# Patient Record
Sex: FEMALE | Race: WHITE | Employment: FULL TIME | URBAN - METROPOLITAN AREA
[De-identification: names, ages, dates, MRNs, and addresses within clinical notes are randomized per-mention and may not be internally consistent; named-entity substitution may affect disease eponyms.]

---

## 2019-10-11 ENCOUNTER — OFFICE VISIT (OUTPATIENT)
Dept: INTERNAL MEDICINE | Facility: CLINIC | Age: 24
End: 2019-10-11
Payer: COMMERCIAL

## 2019-10-11 VITALS
OXYGEN SATURATION: 99 % | DIASTOLIC BLOOD PRESSURE: 76 MMHG | WEIGHT: 139 LBS | SYSTOLIC BLOOD PRESSURE: 110 MMHG | HEART RATE: 70 BPM

## 2019-10-11 DIAGNOSIS — Z12.4 SCREENING FOR CERVICAL CANCER: ICD-10-CM

## 2019-10-11 DIAGNOSIS — Z23 NEED FOR MENINGITIS VACCINATION: ICD-10-CM

## 2019-10-11 DIAGNOSIS — Z00.00 ROUTINE HISTORY AND PHYSICAL EXAMINATION OF ADULT: Primary | ICD-10-CM

## 2019-10-11 SDOH — HEALTH STABILITY: MENTAL HEALTH: HOW OFTEN DO YOU HAVE A DRINK CONTAINING ALCOHOL?: 2-3 TIMES A WEEK

## 2019-10-11 NOTE — NURSING NOTE
Chief Complaint   Patient presents with     Gyn Exam     pt here for pap smear     Imm/Inj     2nd meningitis shot and flu shot     Kimberly Nissen, EMT at 10:23 AM on 10/11/2019

## 2019-10-11 NOTE — PROGRESS NOTES
[facility]  Primary Care Center   Cheyenne Hinson, NGOZI CNP  10/11/2019      Chief Complaint:   Gyn Exam and Imm/Inj     History of Present Illness:   Charleen Perales is a 24 year old female with a history of epilepsy who presents to establish care and for an annual physical exam with pap smear.  She moved here from [place] 3 months ago for a new job and is a professional ballroom dancer and instructor.     Epilepsy  She was diagnosed with epilepsy in 2004 and was cleared in 2008. She did take medications previously, but does not recall which ones. She had a head injury on the playground as a child and a suitcase fell on her head prior to the seizures.     Costochondritis   She was seen for this in [place] about 6 months ago and was confirmed by ultrasound. It is tender when palpated. She has used ice with no relief. She was told it would resolve without intervention.     Other concerns discussed:  1. Meningitis - She had the first shot in March 2019 and was told she needs a second booster vaccine. She used to live in communal housing and graduated college in May 2019.   2. Diet - She eats fish and does not eat meat.   3. Menses - She has regular periods with no heavy bleeding or clots.    4. Pap - She has never had a pap smear before and has never been sexually active.    Health Maintenance  Pap (females age 21 - 65): every 3 years, every 5 years after age 35 if cotesting done - Recommended, completed today, never had one prior   Glucose: every 5 years, yearly if risk factors - Up to date  Lipid panel: every 5 years, yearly if risk factors - Up to date  Immunizations (Tetanus every 10 years, Influenza yearly, Pneumonia PPV-23 and PCV-13 age ? 65 or sooner if risk factors) - Recommended, flu shot, second meningitis vaccine since she has already had the first one     The following health maintenance issues were also reviewed and addressed as needed:  Blood pressure (>18 years annually)  Depression - PHQ-2  Score: 0/6  Lifestyle habits (including exercise, diet, weight)  Health risk behaviors (sexual history, alcohol, tobacco, drug use, partner violence)  Routine eye, dental, hearing, and skin exams     Review of Systems:   Pertinent items are noted in HPI or as in patient entered ROS below, remainder of complete ROS is negative.   Answers for HPI/ROS submitted by the patient on 10/10/2019   General Symptoms: No  Skin Symptoms: No  HENT Symptoms: No  EYE SYMPTOMS: No  HEART SYMPTOMS: No  LUNG SYMPTOMS: No  INTESTINAL SYMPTOMS: No  URINARY SYMPTOMS: No  GYNECOLOGIC SYMPTOMS: No  BREAST SYMPTOMS: No  SKELETAL SYMPTOMS: No  BLOOD SYMPTOMS: No  NERVOUS SYSTEM SYMPTOMS: No  MENTAL HEALTH SYMPTOMS: No    Active Medications:   No current outpatient medications on file.      Allergies:   Patient has no known allergies.      Past Medical History:  Epilepsy      Past Surgical History:  [place] [facility], 4-5 years ago     Family History:    Father - non-Hodgkin's lymphoma, in remission     Social History:   The patient was alone.   Smoking Status: Never smoker    Smokeless Tobacco: Never used   Alcohol Use: Yes, 1 -2 drinks a few times a week  Marital Status: Never been sexually active, not in a relationship   Employment status:  Professional ballroom dancer and instructor.   Home: Originally from [place] ([place] of [place])     Physical Exam:   /76   Pulse 70   Wt 63 kg (139 lb)   LMP 10/07/2019 (Exact Date)   SpO2 99%      Constitutional: no distress, comfortable, pleasant, well-groomed  Eyes: anicteric, conjunctiva pink, normal extra-ocular movements   Ears, Nose and Throat: tympanic membranes pearly gray with positive light reflex, EACs clear bilaterally, nose clear and free of lesions, throat clear, mucosa pink and moist.   Neck: supple with full range of motion, no thyromegaly.   Cardiovascular: regular rate and rhythm, normal S1 and S2, no murmurs, rubs or gallops, peripheral pulses full and  symmetric  Respiratory: clear to auscultation with good air movement bilaterally, no wheezes or crackles, non-labored  Gastrointestinal: positive bowel sounds, nontender, no hepatosplenomegaly, no masses   :  Periurethral, vulvar, perineal, perianal areas are without rash or suspicious lesions.  Vaginal mucosa pink and moist, rugae with physiologic white odorless secretions, no lesions noted. Cervix is pink, moist, closed and without lesion or CMT.  Bimanual exam reveals no uterine or adnexal tenderness.  Uterus and ovaries not enlarged, no masses appreciated.  Musculoskeletal: full range of motion, strength 5/5, no edema   Skin: no concerning lesions or rash, no jaundice, temp normal   Neurological: cranial nerves intact, normal strength and sensation, 2+ patellar reflexes, gait is steady with intact balance, speech is clear, no tremor   Psychological: appropriate mood, demonstrates intact judgment and logical thought process  LYMPH: no cervical,  supraclavicular, or infraclavicular nodes     Assessment and Plan:  Routine history and physical examination of adult  Reviewed and updated routine health maintenance as appropriate. Encouraged to continue with healthy lifestyle with regular physical activity, nutritious diet, stress management and safety. Condom use recommended if sexually active, RTC to discuss birth control options as needed in the future. Will request records from [place] for ultrasound results for her costochondritis.   Here for preventive examination  Breast examination performed.No  Pelvic examination performed Yes    Pap   Yes  If normal PAP results pap every 3 years.  Breast cancer screening  not indicated Not in age gruop  Colorectal screening not indicated  Not in age group  Osteoporosis screening not indicated.  Not in age group.  Immunizations reviewed and updated in EPIC  Labs ordered None  Counseling was provided in the following areas:  safe sex practices/STD prevention if she decides to  become sexually active    Need for meningitis vaccination  Low-risk, but will complete series for full immunity.    - meningococcal vaccine B, Recomb, (BEXSERO) injection  Dispense: 0.5 mL; Refill: 0    Screening for cervical cancer  Completed in clinic today with no complications.   - Pap imaged thin layer screen only - recommended age 21 - 24 years    Follow-up: PRN      Scribe Disclosure:  I, Liliana Rivas, am serving as a scribe to document services personally performed by NGOZI Roberson CNP at this visit, based upon the provider's statements to me. All documentation has been reviewed by the aforementioned provider prior to being entered into the official medical record.     Portions of this medical record were completed by a scribe. UPON MY REVIEW AND AUTHENTICATION BY ELECTRONIC SIGNATURE, this confirms (a) I performed the applicable clinical services, and (b) the record is accurate.     NGOZI Roberson CNP

## 2019-10-16 LAB
COPATH REPORT: NORMAL
PAP: NORMAL

## 2020-10-01 ENCOUNTER — OFFICE VISIT (OUTPATIENT)
Dept: FAMILY MEDICINE | Facility: CLINIC | Age: 25
End: 2020-10-01
Payer: COMMERCIAL

## 2020-10-01 VITALS
WEIGHT: 136 LBS | HEART RATE: 85 BPM | HEIGHT: 67 IN | DIASTOLIC BLOOD PRESSURE: 60 MMHG | TEMPERATURE: 97.6 F | OXYGEN SATURATION: 97 % | BODY MASS INDEX: 21.35 KG/M2 | RESPIRATION RATE: 14 BRPM | SYSTOLIC BLOOD PRESSURE: 102 MMHG

## 2020-10-01 DIAGNOSIS — Z23 NEED FOR VACCINATION: ICD-10-CM

## 2020-10-01 DIAGNOSIS — M79.89 MASS OF SOFT TISSUE: Primary | ICD-10-CM

## 2020-10-01 DIAGNOSIS — Z23 NEED FOR DIPHTHERIA-TETANUS-PERTUSSIS (TDAP) VACCINE: ICD-10-CM

## 2020-10-01 DIAGNOSIS — Z23 NEED FOR PROPHYLACTIC VACCINATION AND INOCULATION AGAINST INFLUENZA: ICD-10-CM

## 2020-10-01 PROCEDURE — 99213 OFFICE O/P EST LOW 20 MIN: CPT | Performed by: NURSE PRACTITIONER

## 2020-10-01 PROCEDURE — 90686 IIV4 VACC NO PRSV 0.5 ML IM: CPT | Performed by: NURSE PRACTITIONER

## 2020-10-01 ASSESSMENT — PAIN SCALES - GENERAL: PAINLEVEL: NO PAIN (0)

## 2020-10-01 ASSESSMENT — MIFFLIN-ST. JEOR: SCORE: 1404.52

## 2020-10-01 NOTE — NURSING NOTE
Tdap and Flu shot administered. Staff unable to enter TDAP in immunization documentation     Adacel   Lot [id number]  Ex- 07/03/22  NDC 12800-989-67  Right Deltoid     Placed into chart historically    Mel Acuña MA

## 2020-10-01 NOTE — PROGRESS NOTES
"Subjective     Charleen Perales is a 25 year old female who presents to clinic today for the following health issues:    HPI         CHEST Bump  First noticed the bump on the left side of upper sternum about two years ago.  Tender only if pushed on firmly.  The bump doesn't move.  Grew for first six months and not since.  Evaluated about 1.5 years ago including US which was normal and she was ultimately diagnosed \"probably costochondritis.\"  This was in [place].    Believes she needs a second of two Tdap shots.  Reviewed record and it appear she received the second of two meningococcal shots 10/2019 and Tdap was due 4/2020.  Would like to get flu shot.    Review of Systems     ROS:5 point ROS including CONST, HEENT, Respiratory, CV, and GI other than that noted in the HPI,  is negative         Objective    /60   Pulse 85   Temp 97.6  F (36.4  C) (Oral)   Resp 14   Ht 1.702 m (5' 7\")   Wt 61.7 kg (136 lb)   SpO2 97%   BMI 21.30 kg/m    Body mass index is 21.3 kg/m .  Physical Exam   GENERAL: healthy, alert and no distress  NECK: no adenopathy, no asymmetry, masses, or scars and thyroid normal to palpation; no supraclavicular or infraclavicular lymphadenopathy  RESP: lungs clear to auscultation - no rales, rhonchi or wheezes  CV: regular rate and rhythm, normal S1 S2, no S3 or S4, no murmur, click or rub, no peripheral edema and peripheral pulses strong  MS: upper extremities - normal muscle tone, normal range of motion and 2 cm non-mobile, firm, palpable mass left chest approximately where 2nd rib attaches to sternum.  It is slightly tender to palpation          Assessment & Plan     (M79.89) Mass of soft tissue  (primary encounter diagnosis)  Comment:   Plan: Awaiting records from [place] including the ultrasound.  No clear diagnosis, but with the area not growing and presumably non-worrisome US results we can continue to monitor.    (Z23) Need for diphtheria-tetanus-pertussis (Tdap) " vaccine  Comment:   Plan: VACCINE ADMINISTRATION, INITIAL, VACCINE         ADMINISTRATION, INITIAL, Each additional admin.        (Right click and add QUANTITY)  [07883],         CANCELED: TDAP, IM (10 - 64 YRS) - Adacel,         CANCELED: TDAP, IM (10 - 64 YRS) - Adacel,         CANCELED: TDAP, IM (10 - 64 YRS) - Adacel,         CANCELED: TDAP VACCINE (ADACEL) [57490.002],         CANCELED: TDAP VACCINE            (Z23) Need for prophylactic vaccination and inoculation against influenza  Comment:   Plan: INFLUENZA VACCINE IM > 6 MONTHS VALENT IIV4         [98760], Vaccine Administration, Initial         [99936], CANCELED: INFLUENZA VACCINE IM 4YRS+ 4        VALENT CCIIV4            (Z23) Need for vaccination  Comment:   Plan:     Return in about 1 year (around 10/1/2021) for Physical Exam.    NGOZI Peñaloza CNP  [facility] [place]

## 2020-12-15 ENCOUNTER — VIRTUAL VISIT (OUTPATIENT)
Dept: INTERNAL MEDICINE | Facility: CLINIC | Age: 25
End: 2020-12-15
Payer: COMMERCIAL

## 2020-12-15 DIAGNOSIS — U07.1 INFECTION DUE TO 2019 NOVEL CORONAVIRUS: ICD-10-CM

## 2020-12-15 DIAGNOSIS — J30.89 NON-SEASONAL ALLERGIC RHINITIS, UNSPECIFIED TRIGGER: Primary | ICD-10-CM

## 2020-12-15 PROCEDURE — 99213 OFFICE O/P EST LOW 20 MIN: CPT | Mod: 95 | Performed by: PATHOLOGY

## 2020-12-15 RX ORDER — FLUTICASONE PROPIONATE 50 MCG
1 SPRAY, SUSPENSION (ML) NASAL DAILY
Qty: 11.1 ML | Refills: 3 | Status: SHIPPED | OUTPATIENT
Start: 2020-12-15

## 2020-12-15 NOTE — PROGRESS NOTES
"This patient is being evaluated via a billable video visit as an alternative to an in-person visit.       The patient has been notified of following:     \"This video visit will be conducted via a call between you and your physician/provider. We have found that certain health care needs can be provided without the need for an in-person physical exam.  This service lets us provide the care you need with a video conversation.  If a prescription is necessary we can send it directly to your pharmacy.  If lab work is needed we can place an order for that and you can then stop by our lab to have the test done at a later time. If during the course of the call the physician/provider feels a video visit is not appropriate, you will not be charged for this service.\"     Patient has given verbal consent for virtual video visit? Yes  Did patient initiate this virtual visit? Yes    Person spoken to:  Patient    This was a synchronous virtual visit  Location of patient: home  Location of physician:  home office  Department name:  Medicine  Mode of communication:  Video Conference via Doximity    Time video initiated:  10:35  Time video ended:  10:52  Total length of video visit: 17 mins      PRIMARY CARE CENTER         HPI:        Charleen Perales is a 25 year old female who presents with concerns regarding a persistent runny nose.     Charleen reports that she had been ill with COVID in mid-October. Symptoms were fatigue and mental fog. Since that time, she reports persistent runny nose and increased sneezing. She denies itchy nose, headache, or facial pain. No known history of allergies. Symptoms are worst in the morning right after waking up and at night. There is a cat in the home. Has not noticed any mold. Denies fevers, chills, cough, shortness of breath, headache. 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 "     Patient had no additional concerns.     Problem, Medication and Allergy Lists were reviewed and are current.  Patient is an established patient of this clinic.         Review of Systems:   10-point ROS was negative other than as per HPI.    ROS  I have personally reviewed and updated the complete ROS on the day of the visit.           Physical Exam:   This was a virtual visit.   No vitals were taken.      Video visit.   Patient sitting upright, conversant, in no acute distress. Speech fluent. No sneezing during this short video interview. Does not appear to be short of breath, speaking full sentences normally.       Results:   Labs/tests ordered this encounter: none    Assessment and Plan     Charleen was seen today for allergic rhinitis.    Diagnoses and all orders for this visit:    Non-seasonal allergic rhinitis  Feel that allergies are due to an allergen present inside the household. Highest concern is that these symptoms are due to the cat. Would encourage keeping the cat off the bed and suggest a HEPA air filter in the bedroom. Prescribed trial of Flonase. Patient encouraged to follow up if symptoms persist.   -     fluticasone (FLONASE) 50 MCG/ACT nasal spray; Spray 1 spray into both nostrils daily    Infection due to 2019 novel coronavirus  -     COVID-19 Virus (Coronavirus) Antibody & Titer Reflex; Future      Options for treatment and follow-up care were reviewed with the patient. Charleen GOODEN Diaz engaged in the decision making process and verbalized understanding of the options discussed and agreed with the final plan.      Florinda Lemon MD MPH, PGY-6  Internal Medicine  p:[phone]  December 15, 2020      Pt was seen and plan of care discussed with attending physician Dr. YOVANNY Mendiola

## 2020-12-15 NOTE — NURSING NOTE
Chief Complaint   Patient presents with     Recheck Medication     follow up     Kimberly Nissen, EMT at 9:42 AM on 12/15/2020'

## 2021-01-03 ENCOUNTER — HEALTH MAINTENANCE LETTER (OUTPATIENT)
Age: 26
End: 2021-01-03

## 2021-01-25 DIAGNOSIS — U07.1 INFECTION DUE TO 2019 NOVEL CORONAVIRUS: ICD-10-CM

## 2021-01-25 PROCEDURE — 36415 COLL VENOUS BLD VENIPUNCTURE: CPT | Performed by: PATHOLOGY

## 2021-01-25 PROCEDURE — 86769 SARS-COV-2 COVID-19 ANTIBODY: CPT | Performed by: PATHOLOGY

## 2021-01-27 LAB
SARS-COV-2 AB PNL SERPL IA: POSITIVE
SARS-COV-2 IGG SERPL IA-ACNC: NORMAL

## 2021-07-06 ENCOUNTER — E-VISIT (OUTPATIENT)
Dept: URGENT CARE | Facility: URGENT CARE | Age: 26
End: 2021-07-06
Payer: COMMERCIAL

## 2021-07-06 DIAGNOSIS — J30.2 SEASONAL ALLERGIC RHINITIS, UNSPECIFIED TRIGGER: Primary | ICD-10-CM

## 2021-07-06 PROCEDURE — 99421 OL DIG E/M SVC 5-10 MIN: CPT | Performed by: FAMILY MEDICINE

## 2021-07-06 RX ORDER — FLUTICASONE PROPIONATE 50 MCG
2 SPRAY, SUSPENSION (ML) NASAL DAILY
Qty: 15.8 ML | Refills: 0 | Status: SHIPPED | OUTPATIENT
Start: 2021-07-06 | End: 2021-08-05

## 2021-10-10 ENCOUNTER — HEALTH MAINTENANCE LETTER (OUTPATIENT)
Age: 26
End: 2021-10-10

## 2022-01-30 ENCOUNTER — HEALTH MAINTENANCE LETTER (OUTPATIENT)
Age: 27
End: 2022-01-30

## 2022-09-18 ENCOUNTER — HEALTH MAINTENANCE LETTER (OUTPATIENT)
Age: 27
End: 2022-09-18

## 2023-05-07 ENCOUNTER — HEALTH MAINTENANCE LETTER (OUTPATIENT)
Age: 28
End: 2023-05-07

## 2024-07-14 ENCOUNTER — HEALTH MAINTENANCE LETTER (OUTPATIENT)
Age: 29
End: 2024-07-14